# Patient Record
Sex: FEMALE | Race: WHITE | URBAN - METROPOLITAN AREA
[De-identification: names, ages, dates, MRNs, and addresses within clinical notes are randomized per-mention and may not be internally consistent; named-entity substitution may affect disease eponyms.]

---

## 2022-09-12 ENCOUNTER — NURSE ONLY (OUTPATIENT)
Dept: ALLERGY | Facility: CLINIC | Age: 37
End: 2022-09-12
Payer: COMMERCIAL

## 2022-09-12 ENCOUNTER — OFFICE VISIT (OUTPATIENT)
Dept: ALLERGY | Facility: CLINIC | Age: 37
End: 2022-09-12
Payer: COMMERCIAL

## 2022-09-12 VITALS
SYSTOLIC BLOOD PRESSURE: 128 MMHG | OXYGEN SATURATION: 96 % | HEART RATE: 59 BPM | DIASTOLIC BLOOD PRESSURE: 87 MMHG | HEIGHT: 61.9 IN | BODY MASS INDEX: 26.31 KG/M2 | WEIGHT: 143 LBS

## 2022-09-12 DIAGNOSIS — J30.89 ENVIRONMENTAL AND SEASONAL ALLERGIES: ICD-10-CM

## 2022-09-12 DIAGNOSIS — L30.9 DERMATITIS: Primary | ICD-10-CM

## 2022-09-12 DIAGNOSIS — Z91.09 ENVIRONMENTAL ALLERGIES: ICD-10-CM
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
## 2022-09-12 DIAGNOSIS — Z92.29 COVID-19 VACCINE SERIES COMPLETED: ICD-10-CM

## 2022-09-12 PROCEDURE — 95024 IQ TESTS W/ALLERGENIC XTRCS: CPT | Performed by: ALLERGY & IMMUNOLOGY

## 2022-09-12 PROCEDURE — 95004 PERQ TESTS W/ALRGNC XTRCS: CPT | Performed by: ALLERGY & IMMUNOLOGY

## 2022-09-12 RX ORDER — ETONOGESTREL AND ETHINYL ESTRADIOL 11.7; 2.7 MG/1; MG/1
1 INSERT, EXTENDED RELEASE VAGINAL
COMMUNITY

## 2022-09-12 RX ORDER — LIDOCAINE AND PRILOCAINE 25; 25 MG/G; MG/G
CREAM TOPICAL
COMMUNITY
Start: 2022-06-30

## 2022-09-12 RX ORDER — ATORVASTATIN CALCIUM 10 MG/1
10 TABLET, FILM COATED ORAL NIGHTLY
COMMUNITY

## 2022-09-12 RX ORDER — VITAMIN B COMPLEX
TABLET ORAL
COMMUNITY

## 2022-09-12 NOTE — PATIENT INSTRUCTIONS
#1 dermatitis  Started after initial sting/bite by a reported tick. Patient seen by dermatology already. Treated with prednisone and Lyme studies are pending. Follow-up with dermatology as scheduled  Await Lyme testing results  Complete current prednisone with food. May continue with triamcinolone as needed if the rash is more localized. Does not appear to be hive-like as each individual life last longer than 24 hours    #2 environmental allergies  See above skin testing to common environmental allergens to screen for allergic triggers.   Handouts on environmental allergies provided and reviewed including potential treatment options immunotherapy  They consider Zyrtec 10 mg or Xyzal 5 mg if having significant runny nose sneezing itchy watery eyes  May add Flonase or Nasacort 2 sprays per nostril once a day if having prominent nasal congestion postnasal drip     #3 COVID vaccinations up-to-date x3 doses    #4 recommend flu vaccine this fall

## 2022-09-13 ENCOUNTER — PATIENT MESSAGE (OUTPATIENT)
Dept: ALLERGY | Facility: CLINIC | Age: 37
End: 2022-09-13

## 2022-09-14 NOTE — TELEPHONE ENCOUNTER
Dr. Kristi Byrd please see patient's MyChart message with pictures. From: [facility]  To: Anand Wong MD  Sent: 9/13/2022 12:24 PM CDT  Subject: Intradermal reaction    Hi Dr. Kristi Byrd. I'm writing with a follow-up question. I noticed today that the areas around the intradermal test sites were pretty red and some were more inflammed today than yesterday. I know it's pretty normal for any area that had a reaction yesterday to still experience some redness/irritation, but I don't recall anything other than the ragweed site being irritated. I took a picture. Could this be a delayed reaction or is this pretty normal? Thank you for your help. I appreciate your time.  Kat Dillard

## 2022-09-14 NOTE — TELEPHONE ENCOUNTER
Yes it is common to have delayed reactions. These  delayed reactions are typically not IgE mediated.   Continue with supportive measures including cool compresses and topical steroids

## 2023-02-20 ENCOUNTER — TELEPHONE (OUTPATIENT)
Dept: OBGYN | Age: 38
End: 2023-02-20

## 2023-02-20 RX ORDER — ETONOGESTREL/ETHINYL ESTRADIOL .12-.015MG
RING, VAGINAL VAGINAL
Qty: 3 EACH | Refills: 2 | Status: SHIPPED | OUTPATIENT
Start: 2023-02-20